# Patient Record
Sex: FEMALE | Race: WHITE | URBAN - METROPOLITAN AREA
[De-identification: names, ages, dates, MRNs, and addresses within clinical notes are randomized per-mention and may not be internally consistent; named-entity substitution may affect disease eponyms.]

---

## 2022-02-15 ENCOUNTER — OFFICE VISIT (OUTPATIENT)
Dept: URBAN - METROPOLITAN AREA CLINIC 24 | Facility: CLINIC | Age: 77
End: 2022-02-15
Payer: MEDICARE

## 2022-02-15 DIAGNOSIS — G35 MULTIPLE SCLEROSIS: ICD-10-CM

## 2022-02-15 PROCEDURE — 92133 CPTRZD OPH DX IMG PST SGM ON: CPT | Performed by: STUDENT IN AN ORGANIZED HEALTH CARE EDUCATION/TRAINING PROGRAM

## 2022-02-15 PROCEDURE — 92134 CPTRZ OPH DX IMG PST SGM RTA: CPT | Performed by: STUDENT IN AN ORGANIZED HEALTH CARE EDUCATION/TRAINING PROGRAM

## 2022-02-15 PROCEDURE — 99204 OFFICE O/P NEW MOD 45 MIN: CPT | Performed by: STUDENT IN AN ORGANIZED HEALTH CARE EDUCATION/TRAINING PROGRAM

## 2022-02-15 ASSESSMENT — INTRAOCULAR PRESSURE
OS: 18
OD: 16

## 2022-02-15 ASSESSMENT — VISUAL ACUITY
OS: 20/40
OD: 20/70

## 2022-02-15 ASSESSMENT — KERATOMETRY
OS: 43.72
OD: 43.47

## 2022-02-15 NOTE — IMPRESSION/PLAN
Impression: Age-related nuclear cataract, bilateral: H25.13. Plan:  Discussed cataracts with patient. Discussed treatment options. Surgical treatment is recommended. Surgical risks and benefits discussed. Patient elects surgical treatment. Recommend surgery OU, OS first. Patient is candidate/interested in multifocal, toric, standard, LenSx and ORA. Patient advised of implications of lost myopia.

## 2022-02-15 NOTE — IMPRESSION/PLAN
Impression: Open angle with borderline findings, low risk, bilateral: H40.013.
contributing risk factor for GLC & cat sx PXF OS
-- no fhx Plan: Newly noted today: * Pseudoexfoliative material OS * IOP asymmetric (16/18), not consistent with c/d or OCT RNFL but PXF OS
C/D, possible progression vs variation between clinicians, OD>OS
OCT RNFL (2/15/2022) OD 80um, bdln thin inf
   OS 11HV, wnl 360 OU large nerves (2.5mm OD, 2.98mm OS Pt ed condition and findings; will rtc for further testing after cat sx

## 2022-02-15 NOTE — IMPRESSION/PLAN
Impression: Multiple sclerosis: G35. Plan: Pt asymptomatic, no pain on eye movement, diplopia. OCT RNFL 2/15/22: 80/87 um, bdln thin inf. Nerve pink & healthy, distinct margins.

## 2022-03-11 ENCOUNTER — ADULT PHYSICAL (OUTPATIENT)
Dept: URBAN - METROPOLITAN AREA CLINIC 24 | Facility: CLINIC | Age: 77
End: 2022-03-11
Payer: MEDICARE

## 2022-03-11 DIAGNOSIS — Z01.818 ENCOUNTER FOR OTHER PREPROCEDURAL EXAMINATION: Primary | ICD-10-CM

## 2022-03-11 PROCEDURE — 99203 OFFICE O/P NEW LOW 30 MIN: CPT | Performed by: PHYSICIAN ASSISTANT

## 2022-03-11 RX ORDER — ASPIRIN 325 MG/1
325 MG TABLET, COATED ORAL
Qty: 0 | Refills: 0 | Status: ACTIVE
Start: 2022-03-11

## 2022-03-11 RX ORDER — DILTIAZEM HYDROCHLORIDE 30 MG/1
30 MG TABLET, FILM COATED ORAL
Qty: 0 | Refills: 0 | Status: ACTIVE
Start: 2022-03-11

## 2022-03-11 ASSESSMENT — PACHYMETRY
OD: 23.79
OD: 2.61
OS: 23.64
OS: 2.71

## 2022-03-16 ENCOUNTER — PRE-OPERATIVE VISIT (OUTPATIENT)
Dept: URBAN - METROPOLITAN AREA CLINIC 24 | Facility: CLINIC | Age: 77
End: 2022-03-16
Payer: MEDICARE

## 2022-03-16 DIAGNOSIS — Z86.73 OLD CVA: ICD-10-CM

## 2022-03-16 DIAGNOSIS — H40.013 OPEN ANGLE WITH BORDERLINE FINDINGS, LOW RISK, BILATERAL: ICD-10-CM

## 2022-03-16 PROCEDURE — 99204 OFFICE O/P NEW MOD 45 MIN: CPT | Performed by: OPHTHALMOLOGY

## 2022-03-16 PROCEDURE — 92083 EXTENDED VISUAL FIELD XM: CPT | Performed by: OPHTHALMOLOGY

## 2022-03-16 PROCEDURE — 76514 ECHO EXAM OF EYE THICKNESS: CPT | Performed by: OPHTHALMOLOGY

## 2022-03-16 ASSESSMENT — VISUAL ACUITY
OS: 20/40
OD: 20/70

## 2022-03-16 ASSESSMENT — INTRAOCULAR PRESSURE
OD: 17
OS: 18

## 2022-03-16 NOTE — IMPRESSION/PLAN
Impression: Old CVA: Z80.78. Plan: Discussed with patient, understands this may limit vision after surgery.

## 2022-03-16 NOTE — IMPRESSION/PLAN
Impression: Open angle with borderline findings, low risk, bilateral: H40.013.
contributing risk factor for GLC & cat sx PXF OS
-- no fhx Plan: PLAN: Not on any glc meds - followed in general clinic ; test reviewed, IOP is stable ou and so may proceed with cataract surgery with NO MIGS and Discussed glaucoma may limit vision after surgery. Discussed possible unmasking of scotoma after surgery. Glaucoma book given TESTS: Reviewed 3/16/22 Visual Field - OD: Good-overall full; OS: Good-superior defects 3/16/22 Pachymetry - OD: 549; Average thickness and average risk; OS: 561; Average thickness and average risk Discussed Glaucoma diagnosis in detail with patient. Emphasized and explain complaince. poor compliance can lead to Blindness.

## 2022-03-16 NOTE — IMPRESSION/PLAN
Impression: Age-related nuclear cataract, bilateral: H25.13. Plan: PLAN: (( AIM -0.25  OU: INJECTABLE OU (DEXYCU 1st choice), NO ORA OU, NO LRI OU: Declined AMP, EPI, poss stretch, glaucoma coverage, PEX OU - vitrec/sulcus iol backup , DENSE , history of cardiac issues  )) Discussed cataract diagnosis with the patient. Appropriate testing ordered for cataract diagnosis prior to Preop. Risks and benefits of surgical treatment were discussed and understood. Patient desires surgical treatment. Premium options discussed but patient declines and is ok with using glasses after surgery. Patient desires to proceed with surgery OU, OS FIRST. Both eyes examined, medically necessary due to impact in activities of daily living. Discussed with pt the need for glasses after surgery. Discussed there is a chance of developing capsular haze after surgery, which may be corrected with laser/yag after surgery. Discussed higher risks with smaller pupil and discussed iris stretch and higher risks of bleeding. Understands higher risk of complication and delayed healing due to dense cataract and pseudoexfoliation .

## 2022-03-16 NOTE — IMPRESSION/PLAN
Impression: Multiple sclerosis: G35. Plan: per general clinic ; Discussed with patient, understands this may limit vision after surgery.

## 2022-03-23 ENCOUNTER — SURGERY (OUTPATIENT)
Dept: URBAN - METROPOLITAN AREA SURGERY 12 | Facility: SURGERY | Age: 77
End: 2022-03-23
Payer: MEDICARE

## 2022-03-23 DIAGNOSIS — H25.13 AGE-RELATED NUCLEAR CATARACT, BILATERAL: Primary | ICD-10-CM

## 2022-03-23 PROCEDURE — 66984 XCAPSL CTRC RMVL W/O ECP: CPT | Performed by: OPHTHALMOLOGY

## 2022-03-24 ENCOUNTER — POST-OPERATIVE VISIT (OUTPATIENT)
Dept: URBAN - METROPOLITAN AREA CLINIC 24 | Facility: CLINIC | Age: 77
End: 2022-03-24
Payer: MEDICARE

## 2022-03-24 PROCEDURE — 99024 POSTOP FOLLOW-UP VISIT: CPT | Performed by: STUDENT IN AN ORGANIZED HEALTH CARE EDUCATION/TRAINING PROGRAM

## 2022-03-24 ASSESSMENT — INTRAOCULAR PRESSURE: OS: 21

## 2022-03-29 ENCOUNTER — POST-OPERATIVE VISIT (OUTPATIENT)
Dept: URBAN - METROPOLITAN AREA CLINIC 24 | Facility: CLINIC | Age: 77
End: 2022-03-29
Payer: MEDICARE

## 2022-03-29 DIAGNOSIS — Z48.810 ENCOUNTER FOR SURGICAL AFTERCARE FOLLOWING SURGERY ON A SENSE ORGAN: Primary | ICD-10-CM

## 2022-03-29 PROCEDURE — 99024 POSTOP FOLLOW-UP VISIT: CPT | Performed by: STUDENT IN AN ORGANIZED HEALTH CARE EDUCATION/TRAINING PROGRAM

## 2022-03-29 ASSESSMENT — INTRAOCULAR PRESSURE
OD: 19
OS: 17

## 2022-03-29 ASSESSMENT — VISUAL ACUITY: OS: 20/25

## 2022-03-29 NOTE — IMPRESSION/PLAN
Impression: S/P Cataract Extraction by phacoemulsification with IOL placement OS - 6 Days. Encounter for surgical aftercare following surgery on a sense organ  Z48.810.
-- EOM full
-- no diplopia elicited in office today
-- unsure if binocular or monocular -- pt reports diplopia and occasionally polyopia (up to 3 or 4 images) Plan: Reassured pt d/t intermittent nature likely 2' to poor coordination between phakic and pseudophakic eyes. Ed to seek care asap if worsening/incr freq of diplopia or noticing other stroke like symptoms. Patient would like to proceed with cat sx OD and will monitor for changes in diplopia after procedure Recommend ATs QID+ No